# Patient Record
Sex: MALE | Race: WHITE | Employment: UNEMPLOYED | URBAN - NONMETROPOLITAN AREA
[De-identification: names, ages, dates, MRNs, and addresses within clinical notes are randomized per-mention and may not be internally consistent; named-entity substitution may affect disease eponyms.]

---

## 2017-03-23 ENCOUNTER — TELEPHONE (OUTPATIENT)
Dept: FAMILY MEDICINE CLINIC | Age: 47
End: 2017-03-23

## 2017-03-27 ENCOUNTER — OFFICE VISIT (OUTPATIENT)
Dept: FAMILY MEDICINE CLINIC | Age: 47
End: 2017-03-27

## 2017-03-27 VITALS
BODY MASS INDEX: 27.33 KG/M2 | HEIGHT: 71 IN | OXYGEN SATURATION: 98 % | WEIGHT: 195.2 LBS | SYSTOLIC BLOOD PRESSURE: 142 MMHG | DIASTOLIC BLOOD PRESSURE: 88 MMHG | HEART RATE: 71 BPM

## 2017-03-27 DIAGNOSIS — J06.9 URI, ACUTE: ICD-10-CM

## 2017-03-27 DIAGNOSIS — Z76.89 ENCOUNTER TO ESTABLISH CARE: ICD-10-CM

## 2017-03-27 DIAGNOSIS — M25.561 ACUTE PAIN OF RIGHT KNEE: ICD-10-CM

## 2017-03-27 DIAGNOSIS — D22.9 BENIGN NEVUS: Primary | ICD-10-CM

## 2017-03-27 DIAGNOSIS — Z13.220 LIPID SCREENING: ICD-10-CM

## 2017-03-27 PROCEDURE — 99203 OFFICE O/P NEW LOW 30 MIN: CPT | Performed by: NURSE PRACTITIONER

## 2017-03-27 ASSESSMENT — ENCOUNTER SYMPTOMS
CONSTIPATION: 0
HEARTBURN: 0
NAUSEA: 0
ABDOMINAL PAIN: 0
VOMITING: 0
COUGH: 1
BACK PAIN: 0
SORE THROAT: 0
SHORTNESS OF BREATH: 0
DIARRHEA: 0
ROS SKIN COMMENTS: MOLES

## 2017-03-27 ASSESSMENT — PATIENT HEALTH QUESTIONNAIRE - PHQ9
SUM OF ALL RESPONSES TO PHQ QUESTIONS 1-9: 0
2. FEELING DOWN, DEPRESSED OR HOPELESS: 0
1. LITTLE INTEREST OR PLEASURE IN DOING THINGS: 0
SUM OF ALL RESPONSES TO PHQ9 QUESTIONS 1 & 2: 0

## 2017-03-30 ENCOUNTER — TELEPHONE (OUTPATIENT)
Dept: FAMILY MEDICINE CLINIC | Age: 47
End: 2017-03-30

## 2017-03-30 RX ORDER — BENZONATATE 100 MG/1
100 CAPSULE ORAL 3 TIMES DAILY PRN
Qty: 100 CAPSULE | Refills: 0 | Status: SHIPPED | OUTPATIENT
Start: 2017-03-30 | End: 2017-04-06

## 2020-09-30 NOTE — PROGRESS NOTES
[address]    [address] ,  [address] [place] [address]  [place], [facility]  Phone: [phone]   IZ:[phone]    CHIEF COMPLAINT     Chief Complaint   Patient presents with   [address]     NP- diarrhea, abd cramps, blood in stool, f/u post ER-colitis       HPI     Thank you PAYAM Fitch CNP for asking me to see Ino Fontana in consultation. Ino Fontana is a 53 y.o. male  [2] White [1] with no known medical history seen independently with referral after an ED visit and treatment for diarrhea and abdominal pain of 5 days duration. Abdominal pain was achy, constant, nonradiating, and 5/10 in intensity. Pain was aggravated with food intake. Associated with diarrhea and rectal bleeding . Patient was evaluated in [facility] ED on 09/20/2020. Work-up including CT abdomen and pelvis found diffuse wall thickening and edema throughout colon consistent with colitis and mildly enlarged mid to right lower quadrant lymphadenopathy likely reactive. Patient was discharged on ciprofloxacin and flagyl and probiotics to complete 10 days. At present patient reports resolution of abdominal pain and diarrhea. Denies fever, chills, nausea, vomiting, fever, chills, unintentional weight loss, constipation, diarrhea, hematochezia or melenic stools. No family history of colon cancer or GI malignancies. Last Encounter Reviewed: None  Pertinent PMH, FH, SH is reviewed below. Last EGD: None  Last Colonoscopy: About 7 years ago per patient and reportedly normal    Review of available records reveals:   Wt Readings from Last 50 Encounters:   10/01/20 199 lb (90.3 kg)   03/27/17 195 lb 3.2 oz (88.5 kg)   04/19/12 188 lb (85.3 kg)       No components found for: HGBA1C  BP Readings from Last 3 Encounters:   10/01/20 138/80   03/27/17 (!) 142/88   04/19/12 138/90     Health Maintenance   Topic Date Due    HIV screen  04/10/1986    DTaP/Tdap/Td vaccine (1 - Tdap) 04/10/1990    Lipid screen  04/10/2011    Diabetes screen  04/10/2011    Flu vaccine (1) 09/01/2020    Hepatitis A vaccine  Aged Out    Hepatitis B vaccine  Aged Out    Hib vaccine  Aged Out    Meningococcal (ACWY) vaccine  Aged Out    Pneumococcal 0-64 years Vaccine  Aged Out       No components found for: [facility]     PAST MEDICAL HISTORY     Past Medical History:   Diagnosis Date    Arthritis     right knee     FAMILY HISTORY     Family History   Problem Relation Age of Onset    Heart Disease Mother      SOCIAL HISTORY     Social History     Socioeconomic History    Marital status:      Spouse name: Not on file    Number of children: Not on file    Years of education: Not on file    Highest education level: Not on file   Occupational History    Not on file   Social Needs    Financial resource strain: Not on file    Food insecurity     Worry: Not on file     Inability: Not on file    Transportation needs     Medical: Not on file     Non-medical: Not on file   Tobacco Use    Smoking status: Never Smoker    Smokeless tobacco: Never Used   Substance and Sexual Activity    Alcohol use:  Yes     Alcohol/week: 4.0 standard drinks     Types: 4 Cans of beer per week     Comment: daily    Drug use: No    Sexual activity: Yes     Partners: Female   Lifestyle    Physical activity     Days per week: Not on file     Minutes per session: Not on file    Stress: Not on file   Relationships    Social connections     Talks on phone: Not on file     Gets together: Not on file     Attends Mandaeism service: Not on file     Active member of club or organization: Not on file     Attends meetings of clubs or organizations: Not on file     Relationship status: Not on file    Intimate partner violence     Fear of current or ex partner: Not on file     Emotionally abused: Not on file     Physically abused: Not on file     Forced sexual activity: Not on file   Other Topics Concern    Not on file Social History Narrative    Not on file     SURGICAL HISTORY     Past Surgical History:   Procedure Laterality Date    SIGMOIDOSCOPY  04/19/2012     CURRENT MEDICATIONS   (This list may include medications prescribed during this encounter as epic can not insert only the list prior to this encounter.)    ALLERGIES   No Known Allergies  IMMUNIZATIONS     There is no immunization history on file for this patient. REVIEW OF SYSTEMS   See HPI for further details and pertinent postiives. Negative for the following:  Constitutional: Negative for weight change. Negative for appetite change and fatigue. HENT: Negative for nosebleeds, sore throat, mouth sores, and voice change. Respiratory: Negative for cough, choking and chest tightness. Cardiovascular: Negative for chest pain   Gastrointestinal: See HPI  Musculoskeletal: Negative for arthralgias. Skin: Negative for pallor. Neurological: Negative for weakness and light-headedness. Hematological: Negative for adenopathy. Does not bruise/bleed easily. Psychiatric/Behavioral: Negative for suicidal ideas. PHYSICAL EXAM   VITAL SIGNS: /80 (Site: Left Upper Arm)   Temp 97.2 °F (36.2 °C)   Ht 5' 10\" (1.778 m)   Wt 199 lb (90.3 kg)   BMI 28.55 kg/m²   Wt Readings from Last 3 Encounters:   10/01/20 199 lb (90.3 kg)   03/27/17 195 lb 3.2 oz (88.5 kg)   04/19/12 188 lb (85.3 kg)     Constitutional: Well developed, Well nourished, No acute distress, Non-toxic appearance. HENT: Normocephalic, Atraumatic, Bilateral external ears normal, Oropharynx moist, No oral exudates, Nose normal.   Eyes: Conjunctiva normal, No discharge. Neck: Normal range of motion, No tenderness, Supple, No stridor. Lymphatic: No cervical, subclavian, or axillary lymphadenopathy. Cardiovascular: Normal heart rate, Normal rhythm, No murmurs, No rubs, No gallops. Thorax & Lungs: Normal breath sounds, No respiratory distress, No wheezing, No chest tenderness.   Abdomen: Pannus abdomen, normal bowel sounds, soft, non tender, non distended, no hernias   Rectal:  Deferred. Skin: Warm, Dry, No erythema, No rash. No bruising. No spider hemangiomas. Back: No tenderness, No CVA tenderness. Lower Extremities: Intact distal pulses, No edema, No tenderness, No cyanosis, No clubbing. Neurologic: Alert & oriented x 3, Normal motor function, Normal sensory function, No focal deficits noted. No asterixis. RADIOLOGY/PROCEDURES     CT abdomen and pelvis 9/20/2020 at [facility]- diffuse wall thickening and edema throughout colon consistent with colitis and mildly enlarged mid to right lower quadrant lymphadenopathy likely reactive. Labs on 9/21/2020- normal LFTs, Wbc  7.5, hemoglobin 14.2, hematocrit 42.2, platelet 149    FINAL IMPRESSION   Justin Tabares was seen today for establish care. Diagnoses and all orders for this visit:    Abdominal pain and diarrhea due to pancolitis colitis -improved after antibiotic therapy. -     COLONOSCOPY W/ OR W/O BIOPSY in 6-8 weeks to evaluate extent of colitis and rule out concomitant neoplasm.  -     Covid-19 Ambulatory; Future  -  Patient to complete antibiotic course tomorrow. Colonoscopy with alternatives, rationale, benefits and risks, not limited to bleeding, infection, perforation, need of surgery, prolong hospital stay and anesthesia side effects were explained. Patient verbalized understanding and agrees to proceed with colonoscopy. Other orders  -     bisacodyl (DULCOLAX) 5 MG EC tablet; Take 4 tablets by mouth once for 1 dose Take as directed for colonoscopy. -     polyethylene glycol (MIRALAX) 17 GM/SCOOP POWD powder; Take 238 g by mouth daily Take as directed for colonoscopy        Orders Placed This Encounter   Procedures    COLONOSCOPY W/ OR W/O BIOPSY     Scheduling Instructions:      Schedule with anesthesia provided diprivan. Please provide prep of choice instructions and prescription.                General guidelines for holding blood thinners/anticoagulants around endoscopic procedure are but patients are encouraged to check with their prescribing physician. The patient may hold Plavix, Effient, Brilinta 5 days prior to the procedure unless:       A drug eluting stent has been placed within past 12 months. A nondrug eluting stent has been placed within past 1 month. Coumadin may be held 4 days prior to the procedure unless:        Mechanical mitral valve replacement (requires heparin bridge while Coumadin held and is managed by pharmacy)      Pradaxa, Xarelto, Eliquis may be held 2-3 days prior to procedure. According to pharmacokinetics of the drug, package insert, cardiology practice patterns, and T1/2 of theses drugs (12 hrs), Eliquis and Xarelto are held 48hrs prior to any procedure, including major surgical procedures w/o       increased bleeding.  That is usually the standard of care, as coagulation would/should be normalized at 48hrs. Every attempt should be made to maintain ASA 81mg per day throughout the carolina-operative period in patients with diagnosis of ASHD. These recommendations may need to be modified by the provider/ based on risk /benefit analysis of the procedure and the patients history. If anticoagulation can not be held because recent cardiac stent, elective endoscopic procedures should be delayed until they have received the minimum duration of recommended antiplatlet therapy and it can safely be held. Again if unsure, patient should discuss with prescribing physician/service. If anticoagulation can not be stopped, endoscopic procedures can still be performed either diagnostically at a somewhat higher risk. Understand that any therapeutic procedure where anything beyond looking is performed, carries higher risks. For this reason without overt bleeding other testing       such as cologuard may be more appropriate.               High risk endoscopic procedures that require stopping antiplatelet and anticoagulation therapy include polypectomy, biliary or pancreatic sphincterotomy, pneumatic or bougie dilation, PEG placement, therapeutic balloon-assisted enteroscopy, EUS and FNA, tumor ablation by any technique,       cystogastrostomy,and treatment of varices.  Covid-19 Ambulatory     Standing Status:   Future     Standing Expiration Date:   10/1/2021     Scheduling Instructions:      Saline media preferred given current shortage of viral transport media but both acceptable     Order Specific Question:   Status     Answer:   Asymptomatic/Surveillance (e.g. pre-op/pre-procedure, pre-delivery, transfer)     Order Specific Question:   Reason for Test     Answer:   Upcoming elective surgery/procedure/delivery, return to work, or discharge to another facility       ORDERED [facility]     Lab Frequency Next Occurrence       FOLLOWUP   Return if symptoms worsen or fail to improve.           Tommy Moran 9/30/20 10:59 AM EDT    CC:  PAYAM King - CNP

## 2020-10-01 ENCOUNTER — INITIAL CONSULT (OUTPATIENT)
Dept: GASTROENTEROLOGY | Age: 50
End: 2020-10-01
Payer: COMMERCIAL

## 2020-10-01 VITALS
SYSTOLIC BLOOD PRESSURE: 138 MMHG | BODY MASS INDEX: 28.49 KG/M2 | WEIGHT: 199 LBS | HEIGHT: 70 IN | TEMPERATURE: 97.2 F | DIASTOLIC BLOOD PRESSURE: 80 MMHG

## 2020-10-01 PROCEDURE — 99244 OFF/OP CNSLTJ NEW/EST MOD 40: CPT | Performed by: INTERNAL MEDICINE

## 2020-10-01 RX ORDER — POLYETHYLENE GLYCOL 3350 17 G/17G
238 POWDER ORAL DAILY
Qty: 255 G | Refills: 0 | Status: ON HOLD | OUTPATIENT
Start: 2020-10-01 | End: 2020-10-30 | Stop reason: ALTCHOICE

## 2020-10-01 RX ORDER — CIPROFLOXACIN 500 MG/1
TABLET, FILM COATED ORAL
Status: ON HOLD | COMMUNITY
Start: 2020-09-20 | End: 2020-10-30 | Stop reason: ALTCHOICE

## 2020-10-01 RX ORDER — METRONIDAZOLE 500 MG/1
TABLET ORAL
Status: ON HOLD | COMMUNITY
Start: 2020-09-20 | End: 2020-10-30 | Stop reason: ALTCHOICE

## 2020-10-01 NOTE — PATIENT INSTRUCTIONS
or very bloody. Call your doctor now or seek immediate medical care if:  · You have new or worse belly pain. · You have a fever. · You are vomiting. · You cannot pass stools or gas. · You have new or more blood in your stools. Watch closely for changes in your health, and be sure to contact your doctor if:  · You have new or worse symptoms. · You are losing weight. · You do not get better as expected. Where can you learn more? Go to https://FRM Study Course.ZAOZAO. org and sign in to your [employer] account. Enter C719 in the [facility] box to learn more about \"Colitis: Care Instructions. \"     If you do not have an account, please click on the \"Sign Up Now\" link. Current as of: August 12, 2019               Content Version: 12.5  © 1057-8868 [employer]. Care instructions adapted under license by [facility], [employer] [facility] ([facility]). If you have questions about a medical condition or this instruction, always ask your healthcare professional. [place] [address] any warranty or liability for your use of this information. Patient Education        Learning About Colonoscopy  What is a colonoscopy? A colonoscopy is a test (also called a procedure) that lets a doctor look inside your large intestine. The doctor uses a thin, lighted tube called a colonoscope. The doctor uses it to look for small growths called polyps, colon or rectal cancer (colorectal cancer), or other problems like bleeding. During the procedure, the doctor can take samples of tissue. The samples can then be checked for cancer or other conditions. The doctor can also take out polyps. How is a colonoscopy done? This procedure is done in a doctor's office or a clinic or hospital. You will get medicine to help you relax and not feel pain. Some people find that they don't remember having the test because of the medicine. The doctor gently moves the colonoscope, or scope, through the colon.  The scope is also a small video camera. It lets the doctor see the colon and take pictures. How do you prepare for the procedure? You need to clean out your colon before the procedure so the doctor can see all of your colon. This process may start a day or two before the test. This depends on which \"colon prep\" your doctor recommends. To clean your colon, you stop eating solid foods and drink only clear liquids. You can have water, tea, coffee, clear juices, clear broths, flavored ice pops, and gelatin (such as Jell-O). Do not drink anything red or purple. The day or night before the procedure, you drink a large amount of a special liquid. This causes loose, frequent stools. You will go to the bathroom a lot. It's very important to drink all of the liquid. If you have problems drinking it, call your doctor. Some people don't go to work or do their usual activities on the day of the prep. Arrange to have someone take you home after the test.  What can you expect after a colonoscopy? Your doctor will tell you when you can eat and do your usual activities. Drink a lot of fluid after the test to replace the fluids you may have lost during the colon prep. But don't drink alcohol. Your doctor will talk to you about when you'll need your next colonoscopy. The results of your test and your risk for colorectal cancer will help your doctor decide how often you need to be checked. After the test, you may be bloated or have gas pains. You may need to pass gas. If a biopsy was done or a polyp was removed, you may have streaks of blood in your stool (feces) for a few days. If polyps were taken out, your doctor may tell you to avoid taking aspirin and nonsteroidal anti-inflammatory drugs (NSAIDs) for 7 to 14 days. Problems such as heavy rectal bleeding may not occur until several weeks after the test. This isn't common. But it can happen after polyps are removed. Follow-up care is a key part of your treatment and safety.  Be sure to make and go to all appointments, and call your doctor if you are having problems. It's also a good idea to know your test results and keep a list of the medicines you take. Where can you learn more? Go to https://chstephane.MegaZebra. org and sign in to your [employer] account. Enter Z772 in the [facility] box to learn more about \"Learning About Colonoscopy. \"     If you do not have an account, please click on the \"Sign Up Now\" link. Current as of: August 22, 2019               Content Version: 12.5  © 8239-5793 [employer]. Care instructions adapted under license by [facility] ([facility]). If you have questions about a medical condition or this instruction, always ask your healthcare professional. [address] any warranty or liability for your use of this information. Patient Education        Colonoscopy: Before Your Procedure  What is a colonoscopy? A colonoscopy is a test that lets a doctor look inside your colon. The doctor uses a thin, lighted tube called a colonoscope to look for problems. These include small growths called polyps, cancer, or bleeding. During the test, the doctor can take samples of tissue that can be checked for cancer or other problems. This is called a biopsy. The doctor can also take out polyps. Before the test, you will need to stop eating solid foods. You also will drink a liquid or take a tablet that cleans out your colon. This helps your doctor be able to see inside your colon during the test.  Follow-up care is a key part of your treatment and safety. Be sure to make and go to all appointments, and call your doctor if you are having problems. It's also a good idea to know your test results and keep a list of the medicines you take. How do you prepare for the procedure? Procedures can be stressful. This information will help you understand what you can expect. And it will help you safely prepare for your procedure.   Preparing for the procedure  · Be sure you have someone to take you home. Anesthesia and pain medicine will make it unsafe for you to drive or get home on your own. · Understand exactly what procedure is planned, along with the risks, benefits, and other options. · Tell your doctor ALL the medicines, vitamins, supplements, and herbal remedies you take. Some may increase the risk of problems during your procedure. Your doctor will tell you if you should stop taking any of them before the procedure and how soon to do it. · If you take aspirin or some other blood thinner, ask your doctor if you should stop taking it before your procedure. Make sure that you understand exactly what your doctor wants you to do. These medicines increase the risk of bleeding. · Make sure your doctor and the hospital have a copy of your advance directive. If you don't have one, you may want to prepare one. It lets others know your health care wishes. It's a good thing to have before any type of surgery or procedure. Before the procedure  · Follow your doctor's directions about when to stop eating solid foods and drink only clear liquids. You can drink water, clear juices, clear broths, flavored ice pops, and gelatin (such as Jell-O). Do not eat or drink anything red or purple. This includes grape juice and grape-flavored ice pops. It also includes fruit punch and cherry gelatin. · Drink the \"colon prep\" liquid as your doctor tells you. You will want to stay home, because the liquid will make you go to the bathroom a lot. Your stools will be loose and watery. It's very important to drink all of the liquid. If you have problems drinking it, call your doctor. Some doctors may have you take a tablet rather than drink a liquid. · Do not eat any solid foods after you drink the colon prep. · Stop drinking clear liquids 6 to 8 hours before the test.  What happens on the day of the procedure?     · Follow the instructions exactly about when to stop

## 2020-10-27 ENCOUNTER — ANESTHESIA EVENT (OUTPATIENT)
Dept: ENDOSCOPY | Age: 50
End: 2020-10-27
Payer: COMMERCIAL

## 2020-10-30 ENCOUNTER — ANESTHESIA (OUTPATIENT)
Dept: ENDOSCOPY | Age: 50
End: 2020-10-30
Payer: COMMERCIAL

## 2020-10-30 ENCOUNTER — HOSPITAL ENCOUNTER (OUTPATIENT)
Age: 50
Setting detail: OUTPATIENT SURGERY
Discharge: HOME | End: 2020-10-30
Attending: INTERNAL MEDICINE | Admitting: INTERNAL MEDICINE
Payer: COMMERCIAL

## 2020-10-30 VITALS
TEMPERATURE: 98.2 F | HEART RATE: 59 BPM | DIASTOLIC BLOOD PRESSURE: 71 MMHG | HEIGHT: 70 IN | SYSTOLIC BLOOD PRESSURE: 120 MMHG | RESPIRATION RATE: 14 BRPM | OXYGEN SATURATION: 100 % | WEIGHT: 195 LBS | BODY MASS INDEX: 27.92 KG/M2

## 2020-10-30 VITALS
OXYGEN SATURATION: 100 % | SYSTOLIC BLOOD PRESSURE: 127 MMHG | RESPIRATION RATE: 13 BRPM | DIASTOLIC BLOOD PRESSURE: 75 MMHG

## 2020-10-30 PROCEDURE — 7100000011 HC PHASE II RECOVERY - ADDTL 15 MIN: Performed by: INTERNAL MEDICINE

## 2020-10-30 PROCEDURE — 3700000000 HC ANESTHESIA ATTENDED CARE: Performed by: INTERNAL MEDICINE

## 2020-10-30 PROCEDURE — 2500000003 HC RX 250 WO HCPCS: Performed by: NURSE ANESTHETIST, CERTIFIED REGISTERED

## 2020-10-30 PROCEDURE — 2580000003 HC RX 258: Performed by: ANESTHESIOLOGY

## 2020-10-30 PROCEDURE — 3700000001 HC ADD 15 MINUTES (ANESTHESIA): Performed by: INTERNAL MEDICINE

## 2020-10-30 PROCEDURE — 2709999900 HC NON-CHARGEABLE SUPPLY: Performed by: INTERNAL MEDICINE

## 2020-10-30 PROCEDURE — 88305 TISSUE EXAM BY PATHOLOGIST: CPT

## 2020-10-30 PROCEDURE — 7100000010 HC PHASE II RECOVERY - FIRST 15 MIN: Performed by: INTERNAL MEDICINE

## 2020-10-30 PROCEDURE — 45385 COLONOSCOPY W/LESION REMOVAL: CPT | Performed by: INTERNAL MEDICINE

## 2020-10-30 PROCEDURE — 3609010600 HC COLONOSCOPY POLYPECTOMY SNARE/COLD BIOPSY: Performed by: INTERNAL MEDICINE

## 2020-10-30 PROCEDURE — 6360000002 HC RX W HCPCS: Performed by: NURSE ANESTHETIST, CERTIFIED REGISTERED

## 2020-10-30 RX ORDER — SODIUM CHLORIDE, SODIUM LACTATE, POTASSIUM CHLORIDE, CALCIUM CHLORIDE 600; 310; 30; 20 MG/100ML; MG/100ML; MG/100ML; MG/100ML
INJECTION, SOLUTION INTRAVENOUS CONTINUOUS
Status: DISCONTINUED | OUTPATIENT
Start: 2020-10-30 | End: 2020-10-30 | Stop reason: HOSPADM

## 2020-10-30 RX ORDER — SODIUM CHLORIDE 0.9 % (FLUSH) 0.9 %
10 SYRINGE (ML) INJECTION EVERY 12 HOURS SCHEDULED
Status: DISCONTINUED | OUTPATIENT
Start: 2020-10-30 | End: 2020-10-30 | Stop reason: HOSPADM

## 2020-10-30 RX ORDER — LIDOCAINE HYDROCHLORIDE 20 MG/ML
INJECTION, SOLUTION INFILTRATION; PERINEURAL PRN
Status: DISCONTINUED | OUTPATIENT
Start: 2020-10-30 | End: 2020-10-30 | Stop reason: SDUPTHER

## 2020-10-30 RX ORDER — SODIUM CHLORIDE 0.9 % (FLUSH) 0.9 %
10 SYRINGE (ML) INJECTION PRN
Status: DISCONTINUED | OUTPATIENT
Start: 2020-10-30 | End: 2020-10-30 | Stop reason: HOSPADM

## 2020-10-30 RX ORDER — PROPOFOL 10 MG/ML
INJECTION, EMULSION INTRAVENOUS PRN
Status: DISCONTINUED | OUTPATIENT
Start: 2020-10-30 | End: 2020-10-30 | Stop reason: SDUPTHER

## 2020-10-30 RX ADMIN — SODIUM CHLORIDE, POTASSIUM CHLORIDE, SODIUM LACTATE AND CALCIUM CHLORIDE: 600; 310; 30; 20 INJECTION, SOLUTION INTRAVENOUS at 11:52

## 2020-10-30 RX ADMIN — LIDOCAINE HYDROCHLORIDE 50 MG: 20 INJECTION, SOLUTION INFILTRATION; PERINEURAL at 11:55

## 2020-10-30 RX ADMIN — PROPOFOL 440 MG: 10 INJECTION, EMULSION INTRAVENOUS at 11:55

## 2020-10-30 ASSESSMENT — PAIN SCALES - GENERAL: PAINLEVEL_OUTOF10: 0

## 2020-10-30 ASSESSMENT — PAIN - FUNCTIONAL ASSESSMENT: PAIN_FUNCTIONAL_ASSESSMENT: 0-10

## 2020-10-30 NOTE — ANESTHESIA PRE PROCEDURE
Department of Anesthesiology  Preprocedure Note       Name:  Romana Dangelo   Age:  53 y.o.  :  1970                                          MRN:  [id number]         Date:  10/30/2020      Surgeon: Jeanmarie Goodman):  Cornelius Parr MD    Procedure: Procedure(s):  COLON W/ANES. (12:00) COVID RESULTS ON CHART    Medications prior to admission:   Prior to Admission medications    Medication Sig Start Date End Date Taking? Authorizing Provider   ciprofloxacin (CIPRO) 500 MG tablet  20   Historical Provider, MD   metroNIDAZOLE (FLAGYL) 500 MG tablet  20   Historical Provider, MD   Probiotic Product (PROBIOTIC-10 PO) Take by mouth    Historical Provider, MD   polyethylene glycol (MIRALAX) 17 GM/SCOOP POWD powder Take 238 g by mouth daily Take as directed for colonoscopy 10/1/20   Cornelius Parr MD       Current medications:    No current facility-administered medications for this encounter. Current Outpatient Medications   Medication Sig Dispense Refill    ciprofloxacin (CIPRO) 500 MG tablet       metroNIDAZOLE (FLAGYL) 500 MG tablet       Probiotic Product (PROBIOTIC-10 PO) Take by mouth      polyethylene glycol (MIRALAX) 17 GM/SCOOP POWD powder Take 238 g by mouth daily Take as directed for colonoscopy 255 g 0       Allergies:  No Known Allergies    Problem List:  There is no problem list on file for this patient. Past Medical History:        Diagnosis Date    Arthritis     right knee       Past Surgical History:        Procedure Laterality Date    SIGMOIDOSCOPY  2012       Social History:    Social History     Tobacco Use    Smoking status: Never Smoker    Smokeless tobacco: Never Used   Substance Use Topics    Alcohol use: Yes     Alcohol/week: 4.0 standard drinks     Types: 4 Cans of beer per week     Comment: daily                                Counseling given: Not Answered      Vital Signs (Current): There were no vitals filed for this visit. BP Readings from Last 3 Encounters:   10/01/20 138/80   03/27/17 (!) 142/88   04/19/12 138/90       NPO Status:                                                                                 BMI:   Wt Readings from Last 3 Encounters:   10/01/20 199 lb (90.3 kg)   03/27/17 195 lb 3.2 oz (88.5 kg)   04/19/12 188 lb (85.3 kg)     There is no height or weight on file to calculate BMI.    CBC:   Lab Results   Component Value Date    WBC 7.1 09/06/2011    RBC 4.28 09/06/2011    HGB 14.4 09/06/2011    HCT 40.6 09/06/2011    MCV 94.9 09/06/2011    RDW 12.1 09/06/2011     09/06/2011       CMP:   Lab Results   Component Value Date     09/06/2011    K 4.6 09/06/2011     09/06/2011    CO2 29 09/06/2011    BUN 10 09/06/2011    CREATININE 0.7 09/06/2011    GFRAA >60 09/06/2011    AGRATIO 1.5 09/06/2011    GLUCOSE 111 09/06/2011    PROT 7.3 09/06/2011    CALCIUM 8.9 09/06/2011    BILITOT 1.2 09/06/2011    ALKPHOS 73 09/06/2011    AST 21 09/06/2011    ALT 22 09/06/2011       POC Tests: No results for input(s): POCGLU, POCNA, POCK, POCCL, POCBUN, POCHEMO, POCHCT in the last 72 hours.     Coags: No results found for: PROTIME, INR, APTT    HCG (If Applicable): No results found for: PREGTESTUR, PREGSERUM, HCG, HCGQUANT     ABGs: No results found for: PHART, PO2ART, WWU2TST, QYZ2MOV, BEART, I1TORAWX     Type & Screen (If Applicable):  No results found for: LABABO, LABRH    Drug/Infectious Status (If Applicable):  No results found for: HIV, HEPCAB    COVID-19 Screening (If Applicable): No results found for: COVID19      Anesthesia Evaluation  Patient summary reviewed and Nursing notes reviewed no history of anesthetic complications:   Airway: Mallampati: II  TM distance: >3 FB   Neck ROM: full  Mouth opening: > = 3 FB Dental: normal exam         Pulmonary:Negative Pulmonary ROS                              Cardiovascular:Negative CV ROS                      Neuro/Psych:   Negative Neuro/Psych ROS GI/Hepatic/Renal: Neg GI/Hepatic/Renal ROS       (-) GERD, liver disease and no renal disease       Endo/Other: Negative Endo/Other ROS       (-) diabetes mellitus               Abdominal:           Vascular: negative vascular ROS. Anesthesia Plan      TIVA     ASA 2       Induction: intravenous. Anesthetic plan and risks discussed with patient. Plan discussed with CRNA. All questions answered and agrees with plan.         Pooja Hagan MD   10/30/2020

## 2020-10-30 NOTE — ANESTHESIA POSTPROCEDURE EVALUATION
Department of Anesthesiology  Postprocedure Note    Patient: Any Levi  MRN: [id number]  YOB: 1970  Date of evaluation: 10/30/2020  Time:  1:10 PM     Procedure Summary     Date:  10/30/20 Room / Location:  [place] [address] / [facility]    Anesthesia Start:  3731 Anesthesia Stop:  1433    Procedure:  COLON W/ANES. (12:00) COVID RESULTS ON CHART (N/A ) Diagnosis:       Colitis      (COLITIS)    Surgeon:  Humza Bach MD Responsible Provider:  Leigh Bridges MD    Anesthesia Type:  TIVA ASA Status:  2          Anesthesia Type: TIVA    Jonathan Phase I: Jonathan Score: 10    Jonathan Phase II: Jonathan Score: 10    Last vitals: Reviewed and per EMR flowsheets.        Anesthesia Post Evaluation    Patient location during evaluation: PACU  Level of consciousness: awake  Airway patency: patent  Nausea & Vomiting: no nausea  Complications: no  Cardiovascular status: blood pressure returned to baseline  Respiratory status: acceptable  Hydration status: euvolemic

## 2020-10-30 NOTE — H&P
Ton Viramontes    [facility] Prudentnatalia Mcintosh,  [address] [place] [address]  [facility]  Phone: [phone]   GTN:[phone]    CHIEF COMPLAINT     Abdominal pain     HPI     Thank you PAYAM Ojeda - CNP for asking me to see Emily Saucedo in consultation. Emily Saucedo is a 53 y.o. male  [2] White [1] with no known medical history seen independently with referral after an ED visit and treatment for diarrhea and abdominal pain of 5 days duration. Abdominal pain was achy, constant, nonradiating, and 5/10 in intensity. Pain was aggravated with food intake. Associated with diarrhea and rectal bleeding . Patient was evaluated in [facility] ED on 09/20/2020. Work-up including CT abdomen and pelvis found diffuse wall thickening and edema throughout colon consistent with colitis and mildly enlarged mid to right lower quadrant lymphadenopathy likely reactive. Patient was discharged on ciprofloxacin and flagyl and probiotics to complete 10 days. At present patient reports resolution of abdominal pain and diarrhea. Denies fever, chills, nausea, vomiting, fever, chills, unintentional weight loss, constipation, diarrhea, hematochezia or melenic stools. No family history of colon cancer or GI malignancies.        Last Encounter Reviewed: None  Pertinent PMH, FH, SH is reviewed below. Last EGD: None  Last Colonoscopy: About 7 years ago per patient and reportedly normal    Review of available records reveals:   Wt Readings from Last 50 Encounters:   10/01/20 199 lb (90.3 kg)   03/27/17 195 lb 3.2 oz (88.5 kg)   04/19/12 188 lb (85.3 kg)       No components found for: HGBA1C  BP Readings from Last 3 Encounters:   10/01/20 138/80   03/27/17 (!) 142/88   04/19/12 138/90     Health Maintenance   Topic Date Due    HIV screen  04/10/1986    DTaP/Tdap/Td vaccine (1 - Tdap) 04/10/1990    Lipid screen  04/10/2011    Diabetes screen  04/10/2011    Flu vaccine (1) 09/01/2020    Date    SIGMOIDOSCOPY  04/19/2012     CURRENT MEDICATIONS   (This list may include medications prescribed during this encounter as epic can not insert only the list prior to this encounter.)  Current Outpatient Rx   Medication Sig Dispense Refill    ciprofloxacin (CIPRO) 500 MG tablet       metroNIDAZOLE (FLAGYL) 500 MG tablet       Probiotic Product (PROBIOTIC-10 PO) Take by mouth      polyethylene glycol (MIRALAX) 17 GM/SCOOP POWD powder Take 238 g by mouth daily Take as directed for colonoscopy 255 g 0     ALLERGIES   No Known Allergies  IMMUNIZATIONS     There is no immunization history on file for this patient. REVIEW OF SYSTEMS   See HPI for further details and pertinent postiives. Negative for the following:  Constitutional: Negative for weight change. Negative for appetite change and fatigue. HENT: Negative for nosebleeds, sore throat, mouth sores, and voice change. Respiratory: Negative for cough, choking and chest tightness. Cardiovascular: Negative for chest pain   Gastrointestinal: See HPI  Musculoskeletal: Negative for arthralgias. Skin: Negative for pallor. Neurological: Negative for weakness and light-headedness. Hematological: Negative for adenopathy. Does not bruise/bleed easily. Psychiatric/Behavioral: Negative for suicidal ideas. PHYSICAL EXAM   VITAL SIGNS: There were no vitals taken for this visit. Wt Readings from Last 3 Encounters:   10/01/20 199 lb (90.3 kg)   03/27/17 195 lb 3.2 oz (88.5 kg)   04/19/12 188 lb (85.3 kg)     Constitutional: Well developed, Well nourished, No acute distress, Non-toxic appearance. HENT: Normocephalic, Atraumatic, Bilateral external ears normal, Oropharynx moist, No oral exudates, Nose normal.   Eyes: Conjunctiva normal, No discharge. Neck: Normal range of motion, No tenderness, Supple, No stridor. Lymphatic: No cervical, subclavian, or axillary lymphadenopathy.   Cardiovascular: Normal heart rate, Normal rhythm, No murmurs, No rubs, No gallops. Thorax & Lungs: Normal breath sounds, No respiratory distress, No wheezing, No chest tenderness. No gynecomastia. Abdomen: normal bowel sounds, soft, non tender, non distended, scars consistent with stated surgeries, no hernias,   Rectal:  Deferred. Skin: Warm, Dry, No erythema, No rash. No bruising. No spider hemangiomas. Back: No tenderness, No CVA tenderness. Lower Extremities: Intact distal pulses, No edema, No tenderness, No cyanosis, No clubbing. Neurologic: Alert & oriented x 3, Normal motor function, Normal sensory function, No focal deficits noted. No asterixis. RADIOLOGY/PROCEDURES   No results found. FINAL IMPRESSION      Abdominal pain and diarrhea due to pancolitis colitis - resolved after antibiotic therapy. -     COLONOSCOPY W/ OR W/O BIOPSY in 6-8 weeks to evaluate extent of colitis and rule out concomitant neoplasm.  -     Covid-19 Ambulatory; Future  -      Patient to complete antibiotic course tomorrow.     Colonoscopy with alternatives, rationale, benefits and risks, not limited to bleeding, infection, perforation, need of surgery, prolong hospital stay and anesthesia side effects were explained. Patient verbalized understanding and agrees to proceed with colonoscopy.         Other orders  -     bisacodyl (DULCOLAX) 5 MG EC tablet; Take 4 tablets by mouth once for 1 dose Take as directed for colonoscopy. -     polyethylene glycol (MIRALAX) 17 GM/SCOOP POWD powder; Take 238 g by mouth daily Take as directed for colonoscopy  ORDERED FUTURE/PENDING TESTS     Lab Frequency Next Occurrence   Covid-19 Ambulatory Once 10/01/2020       FOLLOWUP   No follow-ups on file.           Nicole Or 10/30/20 8:03 AM EDT    CC:  PAYAM Rios - CNP

## 2020-11-02 NOTE — RESULT ENCOUNTER NOTE
Pt's wife informed.  Placed in recall and HM, states he is still has a lot of stomach pain, wants to know his next step

## 2020-11-09 NOTE — PROGRESS NOTES
[address] ,  [address] [facility], [place] [address]  Phone: [phone]       CHIEF COMPLAINT  Chief Complaint   Patient presents with    Follow-up       SUBJECTIVE  Bg Tee is a 53 y.o. male with recent colitis status post antibiotic therapy in 09/2020. Patient underwent colonoscopy on 10/31/2020 with findings of tubular adenomatous polyp in transverse colon and sigmoid colon. Patient reports doing well and feeling improved. He however reports mild intermittent lower abdominal pain in the mornings and occasional blood on bath tissue on wiping after bowel movement. He denies constipation or straining, reporting soft bowel movements 1-2 times daily. Denies nausea, vomiting, fever, chills, unintentional weight loss, constipation, diarrhea, hematochezia or melenic stools. Date of last office visit and details:   10/1/2020: Bg Tee is a 53 y.o. male  [2] White [1] with no known medical history seen independently with referral after an ED visit and treatment for diarrhea and abdominal pain of 5 days duration. Abdominal pain was achy, constant, nonradiating, and 5/10 in intensity. Pain was aggravated with food intake. Associated with diarrhea and rectal bleeding . Patient was evaluated in [facility] ED on 09/20/2020. Work-up including CT abdomen and pelvis found diffuse wall thickening and edema throughout colon consistent with colitis and mildly enlarged mid to right lower quadrant lymphadenopathy likely reactive. Patient was discharged on ciprofloxacin and flagyl and probiotics to complete 10 days. At present patient reports resolution of abdominal pain and diarrhea. No family history of colon cancer or GI malignancies. Colonoscopy 10/31/2020: A 7 mm sessile polyp in the transverse colon, removed by cold snare polypectomy (tubular adenoma).   A 8 mm sessile polyp in the sigmoid colon, removed by snare cautery polypectomy (tubular adenoma). Normal terminal ileum. Small internal hemorrhoids. Otherwise normal appearing colon mucosa. PAST MEDICAL HISTORY     Past Medical History:   Diagnosis Date    Arthritis     right knee     FAMILY HISTORY     Family History   Problem Relation Age of Onset    Heart Disease Mother      SOCIAL HISTORY     Social History     Socioeconomic History    Marital status:      Spouse name: Not on file    Number of children: Not on file    Years of education: Not on file    Highest education level: Not on file   Occupational History    Not on file   Social Needs    Financial resource strain: Not on file    Food insecurity     Worry: Not on file     Inability: Not on file    Transportation needs     Medical: Not on file     Non-medical: Not on file   Tobacco Use    Smoking status: Never Smoker    Smokeless tobacco: Never Used   Substance and Sexual Activity    Alcohol use: Yes     Alcohol/week: 28.0 standard drinks     Types: 28 Cans of beer per week    Drug use: No    Sexual activity: Yes     Partners: Female   Lifestyle    Physical activity     Days per week: Not on file     Minutes per session: Not on file    Stress: Not on file   Relationships    Social connections     Talks on phone: Not on file     Gets together: Not on file     Attends [employer] service: Not on file     Active member of club or organization: Not on file     Attends meetings of clubs or organizations: Not on file     Relationship status: Not on file    Intimate partner violence     Fear of current or ex partner: Not on file     Emotionally abused: Not on file     Physically abused: Not on file     Forced sexual activity: Not on file   Other Topics Concern    Not on file   Social History Narrative    Not on file     SURGICAL HISTORY     Past Surgical History:   Procedure Laterality Date    COLONOSCOPY N/A 10/30/2020    COLON W/ANES.  (12:00) COVID RESULTS ON CHART performed by Christos Coronel Abdoul Byrne MD at [facility]  04/19/2012     CURRENT MEDICATIONS   (This list may include medications prescribed during this encounter as epic can not insert only the list prior to this encounter.)  Current Outpatient Rx   Medication Sig Dispense Refill    polyethylene glycol (MIRALAX) 17 g packet Take 17 g by mouth daily as needed for Constipation 527 g 1    Probiotic Product (PROBIOTIC-10 PO) Take by mouth       ALLERGIES   No Known Allergies  IMMUNIZATIONS     There is no immunization history on file for this patient. REVIEW OF SYSTEMS   See HPI for further details and pertinent postiives. Negative for the following:  Constitutional: Negative for weight change. Negative for appetite change and fatigue. HENT: Negative for nosebleeds, sore throat, mouth sores, and voice change. Respiratory: Negative for cough, choking and chest tightness. Cardiovascular: Negative for chest pain   Gastrointestinal: See HPI  Musculoskeletal: Negative for arthralgias. Skin: Negative for pallor. Neurological: Negative for weakness and light-headedness. Hematological: Negative for adenopathy. Does not bruise/bleed easily. Psychiatric/Behavioral: Negative for suicidal ideas. PHYSICAL EXAM   VITAL SIGNS: /70   Temp 97.7 °F (36.5 °C)   Ht 5' 10\" (1.778 m)   Wt 196 lb (88.9 kg)   BMI 28.12 kg/m²   Wt Readings from Last 3 Encounters:   11/11/20 196 lb (88.9 kg)   10/30/20 195 lb (88.5 kg)   10/01/20 199 lb (90.3 kg)     Constitutional: Well developed, Well nourished, No acute distress, Non-toxic appearance. HENT: Normocephalic, Atraumatic, Bilateral external ears normal, Oropharynx moist, No oral exudates, Nose normal.   Eyes: Conjunctiva normal, No discharge. Neck: Normal range of motion, No tenderness, Supple, No stridor. Lymphatic: No cervical, subclavian, or axillary lymphadenopathy. Cardiovascular: Normal heart rate, Normal rhythm, No murmurs, No rubs, No gallops.    Thorax & Lungs: Normal breath sounds, No respiratory distress, No wheezing, No chest tenderness. Abdomen: Pannus abdomen, normal bowel sounds, soft, non tender, non distended, scars consistent with stated surgeries, no hernias,    Rectal:  Deferred. Skin: Warm, Dry, No erythema, No rash. No bruising. No spider hemangiomas. Back: No tenderness, No CVA tenderness. Lower Extremities: Intact distal pulses, No edema, No tenderness, No cyanosis, No clubbing. Neurologic: Alert & oriented x 3, Normal motor function, Normal sensory function, No focal deficits noted. No asterixis. No results found. FINAL IMPRESSION   Elsy Rodriguez was seen today for follow-up. Diagnoses and all orders for this visit:    Constipation, unspecified constipation type and occasional rectal bleeding likely due to internal hemorrhoids. Other orders  -     polyethylene glycol (MIRALAX) 17 g packet; Take 17 g by mouth daily as needed for Constipation  -     High fiber diet and increase water intake. No orders of the defined types were placed in this encounter. ORDERED FUTURE/PENDING TESTS     Lab Frequency Next Occurrence   Covid-19 Ambulatory Once 10/01/2020       FOLLOWUP   Return if symptoms worsen or fail to improve. Leobardo Newberry 11/9/20 8:54 AM EST    CC:  No primary care provider on file.

## 2020-11-11 ENCOUNTER — OFFICE VISIT (OUTPATIENT)
Dept: GASTROENTEROLOGY | Age: 50
End: 2020-11-11
Payer: COMMERCIAL

## 2020-11-11 VITALS
TEMPERATURE: 97.7 F | BODY MASS INDEX: 28.06 KG/M2 | SYSTOLIC BLOOD PRESSURE: 120 MMHG | WEIGHT: 196 LBS | HEIGHT: 70 IN | DIASTOLIC BLOOD PRESSURE: 70 MMHG

## 2020-11-11 PROCEDURE — 99213 OFFICE O/P EST LOW 20 MIN: CPT | Performed by: INTERNAL MEDICINE

## 2020-11-11 RX ORDER — POLYETHYLENE GLYCOL 3350 17 G/17G
17 POWDER, FOR SOLUTION ORAL DAILY PRN
Qty: 527 G | Refills: 1 | Status: SHIPPED | OUTPATIENT
Start: 2020-11-11 | End: 2020-12-11

## (undated) DEVICE — ACUSNARE POLYPECTOMY SNARE SOFT: Brand: ACUSNARE

## (undated) DEVICE — ELECTRODE,RADIOTRANSLUCENT,FOAM,3PK: Brand: MEDLINE

## (undated) DEVICE — ENDO CARRY-ON PROCEDURE KIT INCLUDES SUCTION TUBING, LUBRICANT, GAUZE, BIOHAZARD STICKER, TRANSPORT PAD AND INTERCEPT BEDSIDE KIT.: Brand: ENDO CARRY-ON PROCEDURE KIT

## (undated) DEVICE — SNARE VASC L240CM LOOP W10MM SHTH DIA2.4MM RND STIFF CLD